# Patient Record
Sex: MALE | Race: WHITE | Employment: FULL TIME | URBAN - METROPOLITAN AREA
[De-identification: names, ages, dates, MRNs, and addresses within clinical notes are randomized per-mention and may not be internally consistent; named-entity substitution may affect disease eponyms.]

---

## 2022-12-08 ENCOUNTER — APPOINTMENT (OUTPATIENT)
Dept: CT IMAGING | Age: 37
End: 2022-12-08
Payer: COMMERCIAL

## 2022-12-08 ENCOUNTER — HOSPITAL ENCOUNTER (EMERGENCY)
Age: 37
Discharge: HOME | End: 2022-12-08
Attending: EMERGENCY MEDICINE
Payer: COMMERCIAL

## 2022-12-08 VITALS
WEIGHT: 220 LBS | TEMPERATURE: 98.6 F | RESPIRATION RATE: 18 BRPM | BODY MASS INDEX: 29.8 KG/M2 | DIASTOLIC BLOOD PRESSURE: 78 MMHG | HEIGHT: 72 IN | OXYGEN SATURATION: 99 % | HEART RATE: 63 BPM | SYSTOLIC BLOOD PRESSURE: 133 MMHG

## 2022-12-08 DIAGNOSIS — S09.90XA INJURY OF HEAD, INITIAL ENCOUNTER: Primary | ICD-10-CM

## 2022-12-08 PROCEDURE — 70450 CT HEAD/BRAIN W/O DYE: CPT

## 2022-12-08 PROCEDURE — 99284 EMERGENCY DEPT VISIT MOD MDM: CPT

## 2022-12-08 PROCEDURE — 90714 TD VACC NO PRESV 7 YRS+ IM: CPT | Performed by: NURSE PRACTITIONER

## 2022-12-08 PROCEDURE — 72125 CT NECK SPINE W/O DYE: CPT

## 2022-12-08 PROCEDURE — 6360000002 HC RX W HCPCS: Performed by: NURSE PRACTITIONER

## 2022-12-08 PROCEDURE — 90471 IMMUNIZATION ADMIN: CPT | Performed by: NURSE PRACTITIONER

## 2022-12-08 RX ORDER — TETANUS AND DIPHTHERIA TOXOIDS ADSORBED 2; 2 [LF]/.5ML; [LF]/.5ML
0.5 INJECTION INTRAMUSCULAR
Status: COMPLETED | OUTPATIENT
Start: 2022-12-08 | End: 2022-12-08

## 2022-12-08 RX ADMIN — TETANUS AND DIPHTHERIA TOXOIDS ADSORBED 0.5 ML: 2; 2 INJECTION INTRAMUSCULAR at 11:28

## 2022-12-08 ASSESSMENT — PAIN DESCRIPTION - LOCATION: LOCATION: HEAD;NECK

## 2022-12-08 ASSESSMENT — ENCOUNTER SYMPTOMS
BOWEL INCONTINENCE: 0
ABDOMINAL PAIN: 0
PHOTOPHOBIA: 0
VOMITING: 0
NAUSEA: 0
VISUAL CHANGE: 0

## 2022-12-08 ASSESSMENT — PAIN SCALES - GENERAL: PAINLEVEL_OUTOF10: 1

## 2022-12-08 ASSESSMENT — PAIN - FUNCTIONAL ASSESSMENT: PAIN_FUNCTIONAL_ASSESSMENT: 0-10

## 2022-12-08 NOTE — DISCHARGE INSTRUCTIONS
Your CT scans were normal today. Take ibuprofen every 4-6 hours as needed for pain. Apply ice for 10 minutes every 3-4 hours to painful areas to help alleviate pain. It is possible you sustained a concussion. If so, you may have intermittent symptoms for several weeks. Make sure you get plenty of rest for the next couple of days. Limit screen time. Take Tylenol or ibuprofen if needed for pain. Keep wound clean and dry. As we discussed, it is fairly small and should heal well on its own. Follow-up with your primary care provider as needed. Return to the emergency department for any new, worsening, or concerning symptoms.

## 2022-12-08 NOTE — ED PROVIDER NOTES
Emergency Department Provider Note                   PCP:                No primary care provider on file. Age: 44 y.o. Sex: male       ICD-10-CM    1. Injury of head, initial encounter  S09.90XA           DISPOSITION Decision To Discharge 12/08/2022 12:54:23 PM        MDM  Number of Diagnoses or Management Options  Injury of head, initial encounter: new, needed workup  Diagnosis management comments: Otherwise healthy 50-year-old male who presents emergency department today after mechanical fall. Patient struck the back of his head on a wooden table. There is a very small laceration. He is alert and oriented. No focal neurological deficits noted on exam.  Will check CT of the head neck. We will update tetanus today. CT scans are negative for acute process. Encouraged rest, ice pack, and Tylenol or ibuprofen. Red flag symptoms and return precautions discussed. Patient verbalized understanding agreement with instructions, treatment plan, and discharge. Amount and/or Complexity of Data Reviewed  Tests in the radiology section of CPT®: ordered and reviewed  Tests in the medicine section of CPT®: ordered and reviewed  Review and summarize past medical records: yes    Risk of Complications, Morbidity, and/or Mortality  Presenting problems: low  Diagnostic procedures: low  Management options: low    Patient Progress  Patient progress: improved             Orders Placed This Encounter   Procedures    CT HEAD WO CONTRAST    CT CERVICAL SPINE WO CONTRAST        Medications   diptheria-tetanus toxoids (DECAVAC) 2-2 LF/0.5ML injection 0.5 mL (0.5 mLs IntraMUSCular Given 12/8/22 1128)       There are no discharge medications for this patient.        Pat Jules is a 44 y.o. male who presents to the Emergency Department with chief complaint of    Chief Complaint   Patient presents with    Head Injury      Otherwise healthy 50-year-old male presents emergency department today after a fall at work. Patient states that he tripped over an extension cord and hit the back of his head on a wooden table. There was no loss of consciousness. He reports some lightheadedness after the fall. Unsure when his last tetanus vaccine was. He reports some stiffness in his neck as well. Fall  The accident occurred 1 to 2 hours ago. The fall occurred while walking. He landed on A hard floor. The point of impact was the head. The pain is present in the head. He was Ambulatory at the scene. There was No entrapment after the fall. There was No drug use involved in the accident. There was No alcohol use involved in the accident. Associated symptoms include headaches. Pertinent negatives include no visual change, no fever, no numbness, no abdominal pain, no bowel incontinence, no nausea, no vomiting and no loss of consciousness. He has tried nothing for the symptoms. Review of Systems   Constitutional:  Negative for fever. Eyes:  Negative for photophobia and visual disturbance. Gastrointestinal:  Negative for abdominal pain, bowel incontinence, nausea and vomiting. Neurological:  Positive for light-headedness and headaches. Negative for loss of consciousness and numbness. All other systems reviewed and are negative. History reviewed. No pertinent past medical history. History reviewed. No pertinent surgical history. History reviewed. No pertinent family history. Social History     Socioeconomic History    Marital status: Single     Spouse name: None    Number of children: None    Years of education: None    Highest education level: None   Tobacco Use    Smoking status: Never    Smokeless tobacco: Never   Vaping Use    Vaping Use: Every day   Substance and Sexual Activity    Alcohol use: Never    Drug use: Never         Patient has no known allergies. There are no discharge medications for this patient. Vitals signs and nursing note reviewed.    Patient Vitals for the past 4 hrs:   Temp Pulse Resp BP SpO2   12/08/22 1116 98.6 °F (37 °C) 63 18 133/78 99 %          Physical Exam  Vitals and nursing note reviewed. Constitutional:       General: He is not in acute distress. Appearance: Normal appearance. He is not ill-appearing, toxic-appearing or diaphoretic. HENT:      Head: Normocephalic. Laceration present. Comments: Small, approximately 3mm laceration to posterior scalp. Right Ear: External ear normal.      Left Ear: External ear normal.      Nose: Nose normal.   Eyes:      Extraocular Movements: Extraocular movements intact. Conjunctiva/sclera: Conjunctivae normal.      Pupils: Pupils are equal, round, and reactive to light. Cardiovascular:      Rate and Rhythm: Normal rate. Pulmonary:      Effort: Pulmonary effort is normal. No respiratory distress. Abdominal:      General: Abdomen is flat. There is no distension. Musculoskeletal:         General: Normal range of motion. Cervical back: Normal range of motion. Tenderness present. No bony tenderness. No pain with movement. Thoracic back: Normal.      Lumbar back: Normal.      Comments: TTP to bilateral cervical paraspinal region. No midline tenderness with palpation of cervical, thoracic, or lumbar spine. Patient is ambulatory with normal, steady gait and moves all extremities without difficulty. Skin:     General: Skin is warm and dry. Capillary Refill: Capillary refill takes less than 2 seconds. Neurological:      General: No focal deficit present. Mental Status: He is alert and oriented to person, place, and time. GCS: GCS eye subscore is 4. GCS verbal subscore is 5. GCS motor subscore is 6. Motor: Motor function is intact. Coordination: Coordination is intact. Gait: Gait is intact. Psychiatric:         Mood and Affect: Mood normal.         Behavior: Behavior normal.         Thought Content:  Thought content normal.         Judgment: Judgment normal.        Procedures    Results for orders placed or performed during the hospital encounter of 12/08/22   CT HEAD WO CONTRAST    Narrative    EXAM: CT HEAD WITHOUT CONTRAST    INDICATION: fall with head injury. COMPARISON: None. TECHNIQUE: Contiguous axial images were obtained from the skull base through the  vertex without IV contrast. Radiation dose reduction techniques were used for  this study. Our CT scanners use one or all of the following: Automated exposure  control, adjustment of the mA and/or kV according to patient size, iterative  reconstruction. FINDINGS:   Normal appearance of the brain parenchyma without evidence of acute infarction,  hemorrhage, or mass lesion. No hydrocephalus or midline shift. No extra-axial  mass or hemorrhage. The basal cisterns are patent. The visualized portions of the orbits are normal. The mastoid air cells and  paranasal sinuses are patent. The visualized vascular structures have an unremarkable noncontrast appearance. The calvarium and soft tissues appear normal.      Impression    No acute intracranial abnormality evident by CT. CT CERVICAL SPINE WO CONTRAST    Narrative    CT CERVICAL SPINE    INDICATION: Fall. COMPARISON: None. TECHNIQUE: Contiguous axial images from the skull base through the superior  mediastinum were obtained with coronal and sagittal reformations. Radiation dose  reduction techniques were used for this study. Our CT scanners use one or all of  the following: Automated exposure control, adjustment of the mA and/or kV  according to patient size, iterative reconstruction. FINDINGS:  Bones: Vertebral body heights are maintained and alignment is normal.    Intervertebral discs: Disc spaces are maintained. Soft tissues: No prevertebral soft tissue swelling. Impression    No acute traumatic abnormality of the cervical spine.             CT HEAD WO CONTRAST   Final Result   No acute intracranial abnormality evident by CT. CT CERVICAL SPINE WO CONTRAST   Final Result   No acute traumatic abnormality of the cervical spine. Voice dictation software was used during the making of this note. This software is not perfect and grammatical and other typographical errors may be present. This note has not been completely proofread for errors.        Maxx David, FELIX - [place]  12/08/22 [id number]

## 2022-12-08 NOTE — ED TRIAGE NOTES
Patient ambulatory to triage with CO mechanical fall with injury to back of head. Reports \"I saw stars but didn't pass out\". Laceration to posterior head.

## 2022-12-08 NOTE — Clinical Note
Ananda Rosales was seen and treated in our emergency department on 12/8/2022. He may return to work on 12/09/2022. If you have any questions or concerns, please don't hesitate to call.       Armani Huff, FELIX - CNP

## 2024-03-08 ENCOUNTER — HOSPITAL ENCOUNTER (OUTPATIENT)
Dept: GENERAL RADIOLOGY | Age: 39
Discharge: HOME | End: 2024-03-11

## 2024-03-08 DIAGNOSIS — T14.90XA INJURY: ICD-10-CM

## 2024-03-08 PROCEDURE — 73564 X-RAY EXAM KNEE 4 OR MORE: CPT
